# Patient Record
Sex: FEMALE | Race: BLACK OR AFRICAN AMERICAN | NOT HISPANIC OR LATINO | Employment: STUDENT | URBAN - METROPOLITAN AREA
[De-identification: names, ages, dates, MRNs, and addresses within clinical notes are randomized per-mention and may not be internally consistent; named-entity substitution may affect disease eponyms.]

---
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
## 2023-04-11 NOTE — PROGRESS NOTES
Med Requested:   Disp Refills Start End     busPIRone (BUSPAR) 15 MG tablet 60 tablet 2 1/5/2023     Sig - Route: Take 1 tablet by mouth in the morning and 1 tablet in the evening.       Last visit: 1/5/23  Follow Up: 2-3 months  No Show/Cancel: 4/4/23  Next visit: Visit date not found   Sent message to [facility] pool to schedule next appt.     APPROVED NO PROTOCOL AVAILABLE. Orders have been prepped according to providers note.     Subjective:    Patient ID:  Jose Arrington is a 12 y.o. female who presents for evaluation of Sore Throat      HPI     Patient is a 12 year old female who presents today with complaints of increase cut on the throat. Associated symptoms include sore throat. Denies associated symptoms of fever, chills, weakness, fatigue or lightheadedness. Vital signs stable. Symptoms started about one week ago.     Review of Systems   Constitution: Negative for diaphoresis, weakness, malaise/fatigue, weight gain and weight loss.   HENT: Positive for sore throat. Negative for congestion and nosebleeds.    Eyes: Negative for blurred vision and double vision.   Cardiovascular: Negative for chest pain, claudication, cyanosis, dyspnea on exertion, irregular heartbeat, leg swelling, near-syncope, orthopnea, palpitations, paroxysmal nocturnal dyspnea and syncope.   Respiratory: Negative for cough, hemoptysis, shortness of breath, sputum production and wheezing.    Hematologic/Lymphatic: Negative for bleeding problem. Does not bruise/bleed easily.   Skin: Negative for rash.   Musculoskeletal: Negative for back pain, falls, joint pain, joint swelling and neck pain.   Gastrointestinal: Negative for abdominal pain, heartburn and vomiting.   Genitourinary: Negative for dysuria, frequency and hematuria.   Neurological: Negative for difficulty with concentration, dizziness, focal weakness, light-headedness, numbness and seizures.   Psychiatric/Behavioral: Negative for depression, memory loss and substance abuse.   Allergic/Immunologic: Negative for HIV exposure and hives.           No past medical history on file.  No past surgical history on file.  Family History   Problem Relation Age of Onset    Melanoma Neg Hx     Psoriasis Neg Hx     Lupus Neg Hx     Eczema Neg Hx      Social History     Social History    Marital status: Single     Spouse name: N/A    Number of children: N/A    Years of education: N/A     Social History Main Topics     Smoking status: Passive Smoke Exposure - Never Smoker    Smokeless tobacco: None    Alcohol use None    Drug use: Unknown    Sexual activity: Not Asked     Other Topics Concern    None     Social History Narrative    None     Review of patient's allergies indicates:   Allergen Reactions    Phenytoin sodium extended Other (See Comments)     No current outpatient prescriptions on file prior to visit.     No current facility-administered medications on file prior to visit.      .     Objective:    Physical Exam   Constitutional: She is oriented to person, place, and time. She appears well-developed and well-nourished.   HENT:   Mouth/Throat: Mucous membranes are not pale and not dry. Posterior oropharyngeal edema (increase ) present. No oropharyngeal exudate, posterior oropharyngeal erythema or tonsillar abscesses.   Neck: No JVD present. No neck rigidity. No tracheal deviation, no edema, no erythema and normal range of motion present.   Cardiovascular: Exam reveals no gallop and no friction rub.    No murmur heard.  Pulmonary/Chest: Effort normal and breath sounds normal. No stridor. No respiratory distress. She has no wheezes. She has no rales. She exhibits no tenderness.   Abdominal: Soft. Bowel sounds are normal. She exhibits no distension and no mass. There is no tenderness. There is no rebound and no guarding.   Musculoskeletal: She exhibits no edema or deformity.   Neurological: She is alert and oriented to person, place, and time.   Skin: Skin is warm and dry.   Psychiatric: She has a normal mood and affect. Her behavior is normal. Judgment and thought content normal.   Nursing note and vitals reviewed.      Results for orders placed or performed in visit on 03/05/18   POCT Rapid Strep A   Result Value Ref Range    Rapid Strep A Screen Negative Negative     Acceptable Yes        BP 90/70 (BP Location: Right arm, Patient Position: Sitting, BP Method: Small (Manual))   Pulse 70   Temp 98.3  "°F (36.8 °C) (Tympanic)   Resp 16   Ht 5' 3.5" (1.613 m)   Wt 58.1 kg (128 lb 1.4 oz)   LMP 03/05/2018   SpO2 99%   BMI 22.33 kg/m²       Assessment:       1. Pharyngitis, unspecified etiology    2. Sore throat (viral)         Plan:           Jose was seen today for sore throat.    Diagnoses and all orders for this visit:    Pharyngitis, unspecified etiology    Sore throat (viral)  -     POCT Rapid Strep A      Patient was instructed to rest and increase fluids.  Follow up with your primary care provider if symptoms do not improved within a few days or sooner for any new or worsening symptoms.Tylenol/Ibuprofen for elevated temperature.    Report to the Emergency Room shortness of breathsevere pain or swelling, or for any other new and concerning symptoms.            "

## (undated) DEVICE — POSITIONER HEAD DONUT 9IN FOAM

## (undated) DEVICE — NDL HYPODERMIC BLUNT 18G 1.5IN

## (undated) DEVICE — SEE MEDLINE ITEM 157117

## (undated) DEVICE — SUT SILK 2-0 STRANDS 30IN

## (undated) DEVICE — VEST SURGICAL SZ 2

## (undated) DEVICE — GLOVE PROTEXIS HYDROGEL SZ7

## (undated) DEVICE — SUT VICRYL 3-0 27 SH

## (undated) DEVICE — SOL 9P NACL IRR PIC IL

## (undated) DEVICE — GLOVE 7.0 PROTEXIS PI BLUE

## (undated) DEVICE — GAUZE SPONGE 4X4 12PLY

## (undated) DEVICE — CLOSURE SKIN STERI STRIP 1/2X4

## (undated) DEVICE — COVER OVERHEAD SURG LT BLUE

## (undated) DEVICE — SYR 10CC LUER LOCK

## (undated) DEVICE — CONTAINER SPECIMEN STRL 4OZ

## (undated) DEVICE — DRESSING TRANS 2X2 TEGADERM

## (undated) DEVICE — ADHESIVE MASTISOL VIAL 48/BX

## (undated) DEVICE — APPLICATOR CHLORAPREP ORN 26ML

## (undated) DEVICE — SEE MEDLINE ITEM 157027

## (undated) DEVICE — SUT VICRYL PLUS 3-0 SH 18IN

## (undated) DEVICE — SUT MONOCRYL 4.0 PS2 CP496G

## (undated) DEVICE — SEE MEDLINE ITEM 152622

## (undated) DEVICE — ELECTRODE REM PLYHSV RETURN 9

## (undated) DEVICE — SUPPORT ULNA NERVE PROTECTOR

## (undated) DEVICE — SEE MEDLINE ITEM 157137

## (undated) DEVICE — MANIFOLD 4 PORT

## (undated) DEVICE — NDL SAFETY 25G X 1.5 ECLIPSE